# Patient Record
Sex: FEMALE | Race: WHITE | URBAN - METROPOLITAN AREA
[De-identification: names, ages, dates, MRNs, and addresses within clinical notes are randomized per-mention and may not be internally consistent; named-entity substitution may affect disease eponyms.]

---

## 2020-05-06 ENCOUNTER — OFFICE VISIT (OUTPATIENT)
Dept: OBGYN CLINIC | Age: 35
End: 2020-05-06

## 2020-05-06 DIAGNOSIS — Z01.419 ENCOUNTER FOR GYNECOLOGICAL EXAMINATION WITHOUT ABNORMAL FINDING: Primary | ICD-10-CM

## 2020-05-06 RX ORDER — NORETHINDRONE ACETATE AND ETHINYL ESTRADIOL, ETHINYL ESTRADIOL AND FERROUS FUMARATE 1MG-10(24)
1 KIT ORAL DAILY
Qty: 3 PACKAGE | Refills: 4 | Status: SHIPPED | OUTPATIENT
Start: 2020-05-06 | End: 2020-05-06 | Stop reason: SDUPTHER

## 2020-05-06 RX ORDER — NORETHINDRONE ACETATE AND ETHINYL ESTRADIOL, ETHINYL ESTRADIOL AND FERROUS FUMARATE 1MG-10(24)
1 KIT ORAL DAILY
Qty: 3 PACKAGE | Refills: 4 | Status: SHIPPED | OUTPATIENT
Start: 2020-05-06

## 2020-05-06 NOTE — PATIENT INSTRUCTIONS

## 2020-05-06 NOTE — PROGRESS NOTES
Annual exam ages 23-44    Jorge Ledesma is a No obstetric history on file. ,  37 y.o. female   Patient's last menstrual period was 04/22/2020. She presents for her annual checkup. She is having no significant problems. With regard to the Gardasil vaccine, she is older than the FDA approved age to receive it. Menstrual status:    Her periods are normal in flow. She is using three to ten pads or tampons per day, usually regular with a 26-32 day interval with 3-7 day duration. She has dysmenorrhea. She reports no premenstrual symptoms. Contraception:    The current method of family planning is abstinence. Sexual history:    She  reports never being sexually active. Medical conditions:    First GYN visit today. Surgical history confirmed with patient. has a past surgical history that includes hx lithotripsy and hx orthopaedic. Pap and Mammogram History:    Pap today. The patient has not had a recent mammogram.    Breast Cancer History/Substance Abuse: negative    Past Medical History:   Diagnosis Date    Calculus of kidney      Past Surgical History:   Procedure Laterality Date    HX LITHOTRIPSY      HX ORTHOPAEDIC      Hand surgery as a child       Current Outpatient Medications   Medication Sig Dispense Refill    ascorbic acid (VITAMIN C PO) Take  by mouth.  ergocalciferol, vitamin D2, (VITAMIN D2 PO) Take  by mouth.  loratadine (ALAVERT PO) Take  by mouth.  norethindrone-e.estradioL-iron (Lo Loestrin Fe) 1 mg-10 mcg (24)/10 mcg (2) tab Take 1 Tab by mouth daily. 's coupon card:  Fabricio De Guzman # [id number]  PCN #:  [id number]  [facility] #  [id number]  ID#  [id number] 3 Package 4     Allergies: Patient has no known allergies. Tobacco History:  reports that she has never smoked. She has never used smokeless tobacco.  Alcohol Abuse:  reports previous alcohol use. Drug Abuse:  reports previous drug use.     Family Medical/Cancer History:   Family History   Problem Relation Age of Onset    Thyroid Cancer Mother         Review of Systems - History obtained from the patient  Constitutional: negative for weight loss, fever, night sweats  HEENT: negative for hearing loss, earache, congestion, snoring, sorethroat  CV: negative for chest pain, palpitations, edema  Resp: negative for cough, shortness of breath, wheezing  GI: negative for change in bowel habits, abdominal pain, black or bloody stools  : negative for frequency, dysuria, hematuria, vaginal discharge  MSK: negative for back pain, joint pain, muscle pain  Breast: negative for breast lumps, nipple discharge, galactorrhea  Skin :negative for itching, rash, hives  Neuro: negative for dizziness, headache, confusion, weakness  Psych: negative for anxiety, depression, change in mood  Heme/lymph: negative for bleeding, bruising, pallor    Physical Exam    Visit Vitals  LMP 04/22/2020       Constitutional  · Appearance: well-nourished, well developed, alert, in no acute distress    HENT  · Head and Face: appears normal    Neck  · Inspection/Palpation: normal appearance, no masses or tenderness  · Lymph Nodes: no lymphadenopathy present  · Thyroid: gland size normal, nontender, no nodules or masses present on palpation    Chest  · Respiratory Effort: breathing unlabored    Breasts  · Inspection of Breasts: breasts symmetrical, no skin changes, no discharge present, nipple appearance normal, no skin retraction present  · Palpation of Breasts and Axillae: no masses present on palpation, no breast tenderness  · Axillary Lymph Nodes: no lymphadenopathy present    Gastrointestinal  · Abdominal Examination: abdomen non-tender to palpation, normal bowel sounds, no masses present  · Liver and spleen: no hepatomegaly present, spleen not palpable  · Hernias: no hernias identified    Genitourinary  · External Genitalia: normal appearance for age, no discharge present, no tenderness present, no inflammatory lesions present, no masses present, no atrophy present  · Vagina: normal vaginal vault without central or paravaginal defects, no discharge present, no inflammatory lesions present, no masses present  · Bladder: non-tender to palpation  · Urethra: appears normal  · Cervix: normal   · Uterus: normal size, shape and consistency  · Adnexa: no adnexal tenderness present, no adnexal masses present  · Perineum: perineum within normal limits, no evidence of trauma, no rashes or skin lesions present  · Anus: anus within normal limits, no hemorrhoids present  · Inguinal Lymph Nodes: no lymphadenopathy present    Skin  · General Inspection: no rash, no lesions identified    Neurologic/Psychiatric  · Mental Status:  · Orientation: grossly oriented to person, place and time  · Mood and Affect: mood normal, affect appropriate    Assessment:  Routine gynecologic examination  Her current medical status is satisfactory with no evidence of significant gynecologic issues.     Plan:  Counseled re: diet, exercise, healthy lifestyle  Return for yearly wellness visits  Gardisil counseling provided  Pt counseled regarding co-testing for high risk HPV with pap  Rec screening mammo at either 37 or 42

## 2020-05-15 LAB
CYTOLOGIST CVX/VAG CYTO: NORMAL
CYTOLOGY CVX/VAG DOC CYTO: NORMAL
CYTOLOGY CVX/VAG DOC THIN PREP: NORMAL
DX ICD CODE: NORMAL
HPV I/H RISK 1 DNA CVX QL PROBE+SIG AMP: NEGATIVE
Lab: NORMAL
OTHER STN SPEC: NORMAL
STAT OF ADQ CVX/VAG CYTO-IMP: NORMAL

## 2020-07-30 ENCOUNTER — CLINICAL SUPPORT (OUTPATIENT)
Dept: DIABETES SERVICES | Age: 35
End: 2020-07-30

## 2020-07-30 DIAGNOSIS — R73.03 PRE-DIABETES: Primary | ICD-10-CM

## 2020-07-30 NOTE — PROGRESS NOTES
Program for Diabetes Health Assessment - Nutrition Evaluation   DATE: 2020      REFERRING PHYSICIAN: Beverley Juárez  NAME: Jimy Irene : 1985 AGE: 37 y.o. GENDER: female  REASON FOR VISIT: pre diabetes    ASSESSMENT:  Past Medical Hx:   Past Medical History:   Diagnosis Date    Calculus of kidney     Pre Diabetes as of 22    LABS:    Per order A1c on 22 was 6.4 %       FOOD ALLERGIES/INTOLERANCES: none    ANTHROPOMETRICS:      Ht: 63 inches  Wgt : 278.2#     IBW:116 # +/- 10%  ABW:  181#   BMI: 49.2       Reported Wt Hx: Pt reports in HS was 200 # and has had a gradual increase since then to current wgt; heaviest was 280 # in     Estimated Nutritional Needs: 1796 Kcal per day using Peggyann Shaver equation with 1.2 activity factor and ABW    Reported Diet Hx:    Likes a variety of fruits and veggies but not all.      24 Hour Diet Recall  Breakfast   skips 2/week; 3 cups capn crunch/whole lactaid milk   Lunch  12-2   Fish sticks today;    Dinner  116-706 G.  Beans, potato, mini hot dogs and corn bread   Snacks   chips and pretzels   Beverages   water and soda     Exercise/Physical Actvity: chases after her 2 nephews as main exercise; nothing formal    Environmental/Social: lives with mom ; they share the shopping and meal prep but mom does more than her; may eat out on Friday or Saturday if out with friends        NUTRITION DIAGNOSIS:  Excessive Kcal intake related to lack of knowledge of balanced diet as evidenced by A1c and BMI 49.2    NUTRITION INTERVENTION:  Discussed what is pre diabetes including pathophysiology  Reviewed results of DPP  Discussed 150 minutes of aerobic activity weekly  Instructed on balanced plate meal planning and used food models to demo; pt gave a return demonstration of 3 carb choices    PATIENT GOALS:      To lose weight by watching portions daily    To follow up in one month for wgt check and assess of meal planning        Specific tips and techniques to facilitate compliance with above recommendations were provided and discussed. Pt was strongly encourage to begin making necessary changes now, and re-visit the dietitian prn. If further details are desired please feel free to contact me @  [phone]  This phone number was also provided to the patient for any further questions or concerns. --Lamont Viveros RD on 7/30/2020 at 2:08 PM    An electronic signature was used to authenticate this note.  I was in the office for the appointment and time spent: 59 minutes

## 2020-09-03 ENCOUNTER — CLINICAL SUPPORT (OUTPATIENT)
Dept: DIABETES SERVICES | Age: 35
End: 2020-09-03
Payer: MEDICAID

## 2020-09-03 DIAGNOSIS — R73.03 PRE-DIABETES: Primary | ICD-10-CM

## 2020-09-03 PROCEDURE — 97802 MEDICAL NUTRITION INDIV IN: CPT | Performed by: DIETITIAN, REGISTERED

## 2020-09-03 NOTE — PROGRESS NOTES
Program for Diabetes Health Assessment - Nutrition Evaluation   DATE: 9/3/2020      REFERRING PHYSICIAN: IRISH Vaughan  NAME: Daisy Guadalupe : 1985 AGE: 37 y.o. GENDER: female  REASON FOR VISIT: pre diabetes education follow up    ASSESSMENT:  Past Medical Hx:   Past Medical History:   Diagnosis Date    Calculus of kidney            Ht: 63 inches  Wgt : 280#   Up two pounds                IBW:116 # +/- 10%       ABW:  181#      BMI: 49.2              Estimated Nutritional Needs: 1796 Kcal per day using [address] equation with 1.2 activity factor and ABW       Reported Diet Hx:    Pt reports she is eating healthier snacks listing raisins, cheese sticks and fruit as new options. She reports she is down to 1 20 oz bottle of soda a day. Yesterday's recall does not reflect any of these changes. 24 Hour Diet Recall= 102 gms fat for yesterday  Breakfast   0730 7 chicken and cheese taquitos, OJ-amt unknown-42 gms fat   Lunch   1330  whole can chunky chicken and wild rice soup; sandwich with ham, cheese and roast beef with butter; water; Dr. Walden Acron  =~15 gms fat    Snack 1615 Bagel chips-broken pieces= 5 gms fat   Dinner 1930 Chicken and rice with orange sauce; crushed pineapple, large curly fries from [employer]; water =13 gms for fries   Snack 2200 Usman size  [employer] bar = 18 gms fat     Exercise/Physical Actvity: Over weekend met friends in [place] and walked all around there. On  walked around the [facility], nothing further this week due to heat    Environmental/Social: watching nephews during the week. School will start next week for one of them [employer]. Pt reports household is going to be disruptive with an addition being built on the house.          NUTRITION DIAGNOSIS:    Excessive Kcal intake related to lack of knowledge of balanced diet as evidenced by A1c and BMI 49.2    NUTRITION INTERVENTION:  Discussed sources of fat and reviewed label reading for fat  Discussed no more than 50-60 gms fat per day to aid in wgt loss  Discussed perhaps measuring some foods as she can not verbalize her portions well  Provided worksheets from DPP program on social cues for eating -pt to do the self study to see if any of these eating cues are an issue for her      PATIENT GOALS:    To eat smaller portions of foods        Follow up in 6 weeks      Specific tips and techniques to facilitate compliance with above recommendations were provided and discussed. Pt was strongly encourage to begin making necessary changes now, and re-visit the dietitian prn. If further details are desired please feel free to contact me @  [phone]  This phone number was also provided to the patient for any further questions or concerns. --Konstantin Sousa RD on 9/3/2020 at 3:00 PM    An electronic signature was used to authenticate this note.  I was in the office for the appointment and time spent: 50 minutes

## 2023-05-18 RX ORDER — NORETHINDRONE ACETATE AND ETHINYL ESTRADIOL, ETHINYL ESTRADIOL AND FERROUS FUMARATE 1MG-10(24)
1 KIT ORAL DAILY
COMMUNITY
Start: 2020-05-06